# Patient Record
(demographics unavailable — no encounter records)

---

## 2024-10-11 NOTE — PHYSICAL EXAM
[General Appearance - Well Developed] : well developed [Normal Appearance] : normal appearance [Bowel Sounds] : normal bowel sounds [Skin Color & Pigmentation] : normal skin color and pigmentation [Oriented To Time, Place, And Person] : oriented to person, place, and time

## 2024-10-11 NOTE — HISTORY OF PRESENT ILLNESS
[FreeTextEntry1] : Right sided back pain on Sunday.  was seen in the hospital and had right sided stent placed for Hydronephrosis And stones 1 in the right proximal ureter and 1 in the right renal pelvis by my partner Dr. Howie Gomez.   I reviewed the images there is a smaller proximal stone in the ureter.  And a 16 mm stone in the renal pelvis.  Renal pelvis stone is 1200 Hounsfield units however I do not reliably see either stone on the  of the scan or the fluoroscopy images.  Patient has not seen a stone pass in the interval

## 2024-10-18 NOTE — HISTORY OF PRESENT ILLNESS
[FreeTextEntry1] : Right sided back pain on last Sunday.  was seen in the hospital and had right sided stent placed for Hydronephrosis And stones 1 in the right proximal ureter and 1 in the right renal pelvis by my partner Dr. Howie Gomez.   I reviewed the images there is a smaller proximal stone in the ureter.  And a 16 mm stone in the renal pelvis.  Renal pelvis stone is 1200 Hounsfield units however I do not reliably see either stone on the  of the scan or the fluoroscopy images.  Patient has not seen a stone pass in the interval  got kub stone visible   on topical nsaids

## 2024-11-04 NOTE — DISCUSSION/SUMMARY
[FreeTextEntry1] : This is a 60 year old man with a history of morbid obesity, diabetes, hypertension, and hyperlipidemia who presents to the office for a cardiac evaluation.  His BP is now controlled when checked outside of this office.  He will continue amlodipine 5 QD.   He is going to continue olmesartan 40 QD.    He is going to continue Crestor, but increase to dose to 20 QD as his LDL is slightly above goal.  He will have repeat blood work in three months.     We discussed the benefits of a healthy diet, regular exercise and physical activity, and weight loss in detail.    He knows that he needs to continue to lose weight, but is finding it difficult.  [EKG obtained to assist in diagnosis and management of assessed problem(s)] : EKG obtained to assist in diagnosis and management of assessed problem(s)

## 2024-11-04 NOTE — PHYSICAL EXAM
[General Appearance - In No Acute Distress] : no acute distress [Normal Conjunctiva] : the conjunctiva exhibited no abnormalities [Normal Oral Mucosa] : normal oral mucosa [Normal Jugular Venous V Waves Present] : normal jugular venous V waves present [FreeTextEntry1] : No JVD [Respiration, Rhythm And Depth] : normal respiratory rhythm and effort [Auscultation Breath Sounds / Voice Sounds] : lungs were clear to auscultation bilaterally [Exaggerated Use Of Accessory Muscles For Inspiration] : no accessory muscle use [Bowel Sounds] : normal bowel sounds [Abdomen Soft] : soft [Abnormal Walk] : normal gait [Nail Clubbing] : no clubbing of the fingernails [Cyanosis, Localized] : no localized cyanosis [Skin Color & Pigmentation] : normal skin color and pigmentation [] : no rash [Not Palpable] : not palpable [No Precordial Heave] : no precordial heave was noted [Normal Rate] : normal [Rhythm Regular] : regular [Normal S1] : normal S1 [Normal S2] : normal S2 [Distant] : the heart sounds were distant [No Murmur] : no murmurs heard [Right Carotid Bruit] : no bruit heard over the right carotid [Left Carotid Bruit] : no bruit heard over the left carotid [1+] : left 1+ [No Abnormalities] : the abdominal aorta was not enlarged and no bruit was heard [No Pitting Edema] : no pitting edema present

## 2024-11-04 NOTE — HISTORY OF PRESENT ILLNESS
[FreeTextEntry1] : This is a 60 year old man with a history of morbid obesity, diabetes, hypertension, and hyperlipidemia who presents to the office for a cardiac evaluation.  His BP is now well controlled since adding amlodipine to his medication regimen.   He is taking his medications.    He denies PND, orthopnea, or LE swelling.  NO chest pain.  No palpitations, dizziness, or syncope.  His LDL is at goal.  Overall he has been doing well.  NO new exertional symptoms since his last visit.  He is not exercising, and has gained weight.  He has been diagnosed with kidney stones, and had ureteral stents placed.  He is going to undergo lithotripsy.  He was started on metformin.  His LDL is still a bit above goal.  His BP is controlled when checked at outside office visits.

## 2024-12-06 NOTE — HISTORY OF PRESENT ILLNESS
[FreeTextEntry1] : Patient returns he has brought stone fragments which she has passed and feels less fragments pass prior to his most recent x-ray.  He does have some stent discomfort particularly prior to using the restroom.  He is fine going to [facility] in [place].  I reviewed his KUB with him I see no evidence of residual mid ureteral calculus he has phleboliths near the distal stent during his treatment he had approximately thousand shocks given to the proximal stone as he had clear changes in the mid ureteral stone during the procedure there is a collection of stone fragments along the proximal stent and a large residual portion of the renal pelvic stone.

## 2025-02-10 NOTE — HISTORY OF PRESENT ILLNESS
[FreeTextEntry1] : pt returns he has not seen stone pass since last eswl.  reviewed kub with him the collection of stone along stent is reduced considerable there is a small decrease in sized of the right renal pelvis stone will a motheaten appearance and satellite fragments.   Render In Strict Bullet Format?: No Detail Level: Zone Initiate Treatment: Protopic BID PRN\\nTriamcinolone BID x 2 weeks

## 2025-03-21 NOTE — PHYSICAL EXAM
[General Appearance - Well Developed] : well developed [General Appearance - Well Nourished] : well nourished [Penis Abnormality] : normal circumcised penis [de-identified] : buired phallus

## 2025-03-21 NOTE — HISTORY OF PRESENT ILLNESS
[FreeTextEntry1] : pt returns for stent removal.  in cysto found to already in a week have e/o stricture recurrence.  Discussed findings in jan and march

## 2025-03-24 NOTE — HISTORY OF PRESENT ILLNESS
[FreeTextEntry1] : This is a 61 year old man with a history of morbid obesity, diabetes, hypertension, and hyperlipidemia who presents to the office for a cardiac evaluation.  His BP is now well controlled since adding amlodipine to his medication regimen.   He is taking his medications.   He has been dealing with kidney stones, and underwent lithotripsy and stenting.  He just had the stents removed last week.   He denies PND, orthopnea, or LE swelling.  NO chest pain.  No palpitations, dizziness, or syncope.     NO new exertional symptoms since his last visit.  He is not exercising secondary to the stones, but is starting up again.   He was started on metformin, but is getting bloated.   His LDL is still a bit above goal.  He is going to discuss weight loss medications with his PMD instead of the metformin.

## 2025-03-24 NOTE — DISCUSSION/SUMMARY
[FreeTextEntry1] : This is a 61 year old man with a history of morbid obesity, diabetes, hypertension, and hyperlipidemia who presents to the office for a cardiac evaluation.  His BP is now controlled.   He will continue amlodipine 5 QD.   He is going to continue olmesartan 40 QD.    He is going to continue Crestor.  It was increased to 20.  He has follow up blood work with his PMD scheduled end of April.  He will also discuss switching metformin, possibly to Mounjaro.  We discussed the benefits of a healthy diet, regular exercise and physical activity, and weight loss in detail.    He knows that he needs to continue to lose weight, but is finding it difficult.  He will follow with me in six months.  [EKG obtained to assist in diagnosis and management of assessed problem(s)] : EKG obtained to assist in diagnosis and management of assessed problem(s)

## 2025-03-24 NOTE — PHYSICAL EXAM
[General Appearance - In No Acute Distress] : no acute distress [Normal Conjunctiva] : the conjunctiva exhibited no abnormalities [Normal Oral Mucosa] : normal oral mucosa [Normal Jugular Venous V Waves Present] : normal jugular venous V waves present [FreeTextEntry1] : No JVD [Respiration, Rhythm And Depth] : normal respiratory rhythm and effort [Exaggerated Use Of Accessory Muscles For Inspiration] : no accessory muscle use [Auscultation Breath Sounds / Voice Sounds] : lungs were clear to auscultation bilaterally [Bowel Sounds] : normal bowel sounds [Abdomen Soft] : soft [Abnormal Walk] : normal gait [Nail Clubbing] : no clubbing of the fingernails [Cyanosis, Localized] : no localized cyanosis [Skin Color & Pigmentation] : normal skin color and pigmentation [] : no rash [Not Palpable] : not palpable [No Precordial Heave] : no precordial heave was noted [Normal Rate] : normal [Rhythm Regular] : regular [Normal S1] : normal S1 [Normal S2] : normal S2 [Distant] : the heart sounds were distant [No Murmur] : no murmurs heard [Right Carotid Bruit] : no bruit heard over the right carotid [Left Carotid Bruit] : no bruit heard over the left carotid [1+] : left 1+ [No Abnormalities] : the abdominal aorta was not enlarged and no bruit was heard [No Pitting Edema] : no pitting edema present

## 2025-04-03 NOTE — HISTORY OF PRESENT ILLNESS
[FreeTextEntry1] : Patient returns to the office for kidney stone prevention Stone analysis: 90% calcium oxalate and 10% calcium phosphate

## 2025-04-11 NOTE — ASSESSMENT
[FreeTextEntry1] : Patient is a 62 yo M who presents for LUTS, bulbar urethral stricture.  Recent stone procedures with 3 dilations cystoscopically for bulbar stricture. LUTS may be related to increased fluid intake D/w pt that he is not emptying well - PVR 170cc today, UF slightly lower Qmax 10.2 but voided volume only 93cc Dw pt that will allow for more urethral rest after his recent cysto/dilation/stent removal - plan to perform cysto/RUG in 3 months for better assessment for stricture recurrence. D/w pt that generally if there is recurrence would consider urethroplasty given prior endoscopic dilations.  However he is a high risk surgical candidate due to morbid obesity and would consider Optilume as well.  F/u 3 mos for cysto/RUG

## 2025-04-11 NOTE — HISTORY OF PRESENT ILLNESS
[FreeTextEntry1] : Patient is 61 year old man with primary medical history of morbid obesity, T2DM, HTN, HLD and TRESA who presents to the office for urethral stricture.    History of ureteral stone and nephrolithiasis.   S/p R URS with stent placement 10/6/24 for obstructing mid ureteral stone as well as a larger 1.6 cm stone in the right renal pelvis.    He subsequently underwent R ESWL X2 and right ureteral stent replacement and urethral dilation x 2  in 1/2025 and 3/14/25 and stent removal on 3/21/25.   Cystoscopy by Dr. Lewis 3/21/25 noted the anterior urethra had strictures - multiple filmy bands in bulbar urethra. The prostatic urethra had enlargement of the lateral prostatic lobes. The bladder outlet was obstructed. The ureteral orifices were in the normal anatomic position bilaterally and had clear efflux of urine bilaterally. He had dilation done via each cystoscopic procedure, even from initial stent placement by Dr Gomez in 10/2024.  At baseline he reports for past 20 yrs he has had intermittent weak stream, urinary frequency, nocturia x1.  He has been drinking lots of water consistently 8 cups +/day to prevent gout. Denies prior UTI, reports 40 yrs ago had ?UTI/STI - had abx for dysuria then.  Denies trauma, prior procedure/urethral instrumentation to 2024.   [Urinary Incontinence] : no urinary incontinence [Urinary Retention] : no urinary retention [Dysuria] : no dysuria [Hematuria - Gross] : no gross hematuria

## 2025-04-11 NOTE — PHYSICAL EXAM
[General Appearance - Well Developed] : well developed [Normal Appearance] : normal appearance [General Appearance - In No Acute Distress] : no acute distress [Edema] : no peripheral edema [] : no respiratory distress [Exaggerated Use Of Accessory Muscles For Inspiration] : no accessory muscle use [Urinary Bladder Findings] : the bladder was normal on palpation

## 2025-04-22 NOTE — PHYSICAL EXAM
[Alert] : alert [No Acute Distress] : no acute distress [Suborbital Bogginess] : suborbital bogginess (allergic shiners) [Boggy Nasal Turbinates] : boggy and/or pale nasal turbinates [No Neck Mass] : no neck mass was observed [No LAD] : no lymphadenopathy [Normal Rate and Effort] : normal respiratory rhythm and effort [No Crackles] : no crackles [No Retractions] : no retractions [Normal Rate] : heart rate was normal  [Normal S1, S2] : normal S1 and S2 [Regular Rhythm] : with a regular rhythm [Skin Intact] : skin intact  [Wheezing] : no wheezing was heard [de-identified] : obese male

## 2025-04-22 NOTE — HISTORY OF PRESENT ILLNESS
[de-identified] : Patient followed for ELDA - SAC - now with nasal congestion - itchy eyes and sneezing.     Patient has been treated with azelastine BID - Claritin - Pataday in the past with improvement in his allergies.   Patient followed for kidney stones

## 2025-04-22 NOTE — ASSESSMENT
[FreeTextEntry1] : ELDA - SAC   Pataday BID  Azelastine BID  Xyzal 5 mg QD  Continue medications thru July 4th

## 2025-06-24 NOTE — HISTORY OF PRESENT ILLNESS
[FreeTextEntry1] : Patient with history of kidney stones.  Please collect a potassium citrate and started about a week after starting he reports that he developed bilateral back pain and stopped.  The back pain improved thereafter.  He is following with Dr. Martinez for his recurrent strictures. Stone analysis: 90% calcium oxalate and 10% calcium phosphate

## 2025-07-01 NOTE — HISTORY OF PRESENT ILLNESS
[FreeTextEntry1] : 62 y/o father of two, with history of obesity, HTN, hyperlipidemia, morbid obesity, sleep apnea, fatty liver, diabetes, family history of colon cancer (cousin) who presents for a surveillance colonoscopy.  Patient reports feeling better.  He says he had kidney stones last year and required a stent to kidneys.  He says he is considering weight loss drugs.  Recently his metformin has been increased to twice a day for his diabetes.  With metformin he does not feel like eating at night.  At times he may not have a bowel movement since starting metformin.  Patient denies having heartburn, regurgitation, difficulty swallowing, painful swallowing, nausea, vomiting, weight loss, melena and hematochezia.  Patient denies having abdominal pain, diarrhea. All other review of systems are negative.    Initial office visit 5/2021: Never had a colonoscopy. He has rectal bleeding - mainly upon wiping. Has normal bowel movements. No abdominal pain, no rectal pain. No loss of appetite. Lost a little bit of weight since being more physically active and eating healthier.  No heartburn. No use of acid reducers. No dysphagia, odynophagia. No nausea, no vomiting Blood test in March 2021: AST 65, , rest of LFTs normal. Blood work in Jan 2021: ALT was 127, AST was 65, rest of LFTs.   Abdominal ultrasound on March 2021: Enlarged fatty liver. Focal hypoechoic area in the liver adjacent to the gallbladder may represent fatty sparing. COnsider short - term follow up. Gall stones.  Father had prostate cancer. First degree maternal cousin had colon cancer 65/70s. Only during allergy seasons he will be short of breath. All other review of systems are negative. Follows with cardiologist - last visit in March 2021 with Dr. Ndiaye - noninvasive cardiac evaluation unrevealing.    Discussion/Summary 8/2021: Called patient and results of recent MRI reviewed - gallstones seen - mild steatosis. Patient denies having upper abdominal pain - advised if upper abdominal pain, especially postprandial would need to see surgeon for consideration of cholecystectomy - he verbalized understanding.    DIscussed importance of weight loss for fatty liver - he says he has been trying to loose weight - recent LFTs in June 2021 normal - advised yearly LFTs. Follow up in office.   Colonoscopy 11/2021: 5 colon polyps removed from ascending colon - two were large ~3 cm each. After removal of one large polyp with hot snare- the site was clipped with a hemoclip to prevent delayed bleeding. After removal of another large polyp with hot snare - the site started to actively bleed controlled with deployment of 5 hemoclips. Patient was admitted for 24 hour observation. All polyps were tubular adenomas. The rest of colon not fully assessed - patient was recommended to have a repeat colonoscopy in 2 to 3 months.    Discussion/Summary 11/2021 Received call from service - patient called with complaints of bright red blood per rectum - spoke with patient - reports bright red blood per rectum occurred 5 times this evening - his stomach "growls" but no abdominal pain, light headed ness, dizziness, chest pain. Advised him to go ER - he prefers [facility]. Results of polyps were reviewed as well with him. All polyps tubular adenomas. Will await phone call from ER physician.    Called [facility] - spoke with triage nurse - then called patient again alerting him ER triage aware of him and are expecting him.   Office visit 11/2021: The other day, had "grumbling" in abdominal - Monday morning, while at home he saw clot with stool - today had dark stools. No abdominal pain, diarrhea, constipation.    Office visit July 2023: Patient denies having abdominal pain, constipation, diarrhea, loss of appetite, weight loss, melena and hematochezia. Patient denies having heartburn, regurgitation, difficulty swallowing, painful swallowing, nausea, vomiting. Knee pain - getting PT. All other review of systems are negative. Denies cardiac symptoms.

## 2025-07-01 NOTE — ASSESSMENT
[FreeTextEntry1] : IMPRESSION: # High risk adenoma - 11 Tubular adenomas removed with two being large since 2021 - genetic testing previously recommended. -  Hemoglobin 16.6 with a hematocrit of 50.6 on October 2024 -  LFTs normal on May 2025 -  Hemoglobin A1c 6.6 on May 2025 -  Colonoscopy by Dr. Herrera on January 2024: Small descending colon polyp removed (hyperplastic).  Few ascending colon diverticulosis.  Lipoma appreciated in ascending and left colon.  Good prep.  Colonoscopy in 1 year -  Colonoscopy by Dr. Herrera on 1/2022: Total of six polyps removed. Few ascending colon diverticulosis. 6 mm ascending colon polyp removed (tubular adenoma). 9 mm ascending colon polyp removed (tubular adenoma). Lipoma of the ascending colon biopsied (neg). 4 mm transverse colon polyp removed (tubular adenoma). Lipoma of transverse colon bx (neg path). 6 mm descending colon polyp removed (tubular adenoma). 8 mm sigmoid colon polyp removed (tubular adenoma). 7 mm sigmoid colon polyp removed (tubular adenoma). Mild sigmoid diverticulosis. Rpt in one yr. - Colonoscopy 11/2021 by Dr. Herrera: 5 colon polyps removed from ascending colon - two were large ~3 cm each. After removal of one large polyp with hot snare- the site was clipped with a hemoclip to prevent delayed bleeding. After removal of another large polyp with hot snare - the site started to actively bleed controlled with deployment of 5 hemoclips. Patient was admitted for 24 hour observation. All polyps were tubular adenomas. - Admitted for observation at [facility] for observation because of rectal bleeding on 11/20/2021 - discharged on 11/21/2020 with stable Hgb of 12.  # Family history of colon cancer - First degree cousin with colon cancer in late 60s  # Fatty liver - previously discussed seeing hepatology - risks for cirrhosis reviewed. - MRI 8/2021: Mild steatosis, gallstones - previously advised seeing surgeon if upper abdominal pain - Abdominal ultrasound on March 2021: Enlarged fatty liver. Focal hypoechoic area in the liver adjacent to the gallbladder may represent fatty sparing. Consider short - term follow up. Gallstones.      DISCUSSION/PLAN: I have advised the patient to arrange for a colonoscopy to rule out colon polyps, colorectal cancer etc. under monitored anesthesia care. Risks such as perforation requiring surgery, colostomy, bleeding requiring blood transfusion, infection/sepsis, diverticulitis, colitis, missed colon cancer (2% to 6%), internal organ injury such as splenic hemorrhage (1/6000, risk thin, female gender) etc, adverse reaction to medication etc. and risks of anesthesia including cardiopulmonary compromise requiring ICU care were discussed with patient. Alternatives were discussed (CT colonography, stool test). Patient verbalized understanding and agrees to proceed with a colonoscopy under anesthesia.  Obesity is a known risk factor for inadequate bowel prep. For obese patients, the most effective colonoscopy bowel prep is usually a high-volume, split dose polyethylene glycol (PEG - 4 liters)- based prep -such as GoLYTELY/GaviLyte/Nulytely.  Prep to also include stimulant Dulcolax. Patient was instructed to follow a clear liquid diet one day prior to colonoscopy.  Patient was instructed to follow a low fiber diet one week prior to colonoscopy.  Side effects of bowel preps reviewed - adequate hydration advised.    Medical clearance from PCP - Because of BMI - procedure in hospital setting      Department of medical genetic- contact information given again   Hepatology - since history of fatty liver and elevated HgbA1C - high risk for cirrhosis - weight advised -Blood test -calculate fib 4    Patient was advised that first degree relatives and children should consider screening colonoscopy at age 40.